# Patient Record
Sex: MALE | Race: OTHER | URBAN - NONMETROPOLITAN AREA
[De-identification: names, ages, dates, MRNs, and addresses within clinical notes are randomized per-mention and may not be internally consistent; named-entity substitution may affect disease eponyms.]

---

## 2020-01-03 ENCOUNTER — OFFICE VISIT (OUTPATIENT)
Dept: PRIMARY CARE CLINIC | Age: 50
End: 2020-01-03
Payer: COMMERCIAL

## 2020-01-03 VITALS
DIASTOLIC BLOOD PRESSURE: 78 MMHG | BODY MASS INDEX: 32.72 KG/M2 | SYSTOLIC BLOOD PRESSURE: 130 MMHG | WEIGHT: 203.6 LBS | OXYGEN SATURATION: 97 % | HEART RATE: 91 BPM | RESPIRATION RATE: 18 BRPM | TEMPERATURE: 99.3 F | HEIGHT: 66 IN

## 2020-01-03 LAB
INFLUENZA A ANTIBODY: NORMAL
INFLUENZA B ANTIBODY: NORMAL

## 2020-01-03 PROCEDURE — 99214 OFFICE O/P EST MOD 30 MIN: CPT | Performed by: FAMILY MEDICINE

## 2020-01-03 PROCEDURE — 87804 INFLUENZA ASSAY W/OPTIC: CPT | Performed by: FAMILY MEDICINE

## 2020-01-03 RX ORDER — AMOXICILLIN AND CLAVULANATE POTASSIUM 875; 125 MG/1; MG/1
1 TABLET, FILM COATED ORAL 2 TIMES DAILY
Qty: 20 TABLET | Refills: 0 | Status: SHIPPED | OUTPATIENT
Start: 2020-01-03 | End: 2020-01-13

## 2020-01-03 RX ORDER — ALBUTEROL SULFATE 90 UG/1
2 AEROSOL, METERED RESPIRATORY (INHALATION)
COMMUNITY
Start: 2016-05-18

## 2020-01-03 RX ORDER — DEXTROMETHORPHAN HYDROBROMIDE AND PROMETHAZINE HYDROCHLORIDE 15; 6.25 MG/5ML; MG/5ML
5 SYRUP ORAL NIGHTLY PRN
Qty: 75 ML | Refills: 0 | Status: SHIPPED | OUTPATIENT
Start: 2020-01-03

## 2020-01-03 RX ORDER — LORATADINE 10 MG/1
10 TABLET ORAL DAILY PRN
COMMUNITY

## 2020-01-03 SDOH — HEALTH STABILITY: MENTAL HEALTH: HOW OFTEN DO YOU HAVE A DRINK CONTAINING ALCOHOL?: 2-4 TIMES A MONTH

## 2020-01-03 SDOH — HEALTH STABILITY: MENTAL HEALTH: HOW MANY STANDARD DRINKS CONTAINING ALCOHOL DO YOU HAVE ON A TYPICAL DAY?: 3 OR 4

## 2020-01-03 ASSESSMENT — PATIENT HEALTH QUESTIONNAIRE - PHQ9
SUM OF ALL RESPONSES TO PHQ QUESTIONS 1-9: 0
2. FEELING DOWN, DEPRESSED OR HOPELESS: 0
1. LITTLE INTEREST OR PLEASURE IN DOING THINGS: 0
SUM OF ALL RESPONSES TO PHQ9 QUESTIONS 1 & 2: 0
SUM OF ALL RESPONSES TO PHQ QUESTIONS 1-9: 0

## 2020-01-03 ASSESSMENT — ENCOUNTER SYMPTOMS
COUGH: 1
SINUS PRESSURE: 1
WHEEZING: 1
NAUSEA: 0
DIARRHEA: 0
RHINORRHEA: 1
SWOLLEN GLANDS: 1
SORE THROAT: 1
VOMITING: 0

## 2020-01-04 NOTE — PROGRESS NOTES
[address]  [address], [address] Amber Arriaga  [phone]      Marylene Dawn. is a 47 y.o. male who is c/o of Cough (Cough (yellow, white), congestion, (intermittent) ST, gen. body aches, H/A, felt fevered/chills/sweats & SOB w/exertion x3 days.  )      HPI:     Pharyngitis   This is a new problem. The current episode started in the past 7 days (3 days ago). The problem has been gradually improving. Associated symptoms include chills, congestion, coughing (productive of yellow mucus), diaphoresis, headaches, myalgias, a sore throat and swollen glands. Pertinent negatives include no nausea or vomiting. Fever: unsure - did not check at home; 99.3 here now. Treatments tried: Nyquil/Dayquil, Claritin, warm salt water gargles. Subjective:      Past Medical History:   Diagnosis Date    Allergic rhinitis     Asthma     Diabetes mellitus (HCC)       Past Surgical History:   Procedure Laterality Date    CHOLECYSTECTOMY      HERNIA REPAIR         Social History     Tobacco Use    Smoking status: Former Smoker     Packs/day: 0.25     Years: 28.00     Pack years: 7.00     Start date:      Last attempt to quit: 2012     Years since quittin.0    Smokeless tobacco: Never Used   Substance Use Topics    Alcohol use: Yes     Frequency: 2-4 times a month     Drinks per session: 3 or 4      Current Outpatient Medications   Medication Sig Dispense Refill    albuterol sulfate  (90 Base) MCG/ACT inhaler Inhale 2 puffs into the lungs every 4-6 hours as needed      loratadine (CLARITIN) 10 MG tablet Take 10 mg by mouth daily as needed      metFORMIN (GLUCOPHAGE) 500 MG tablet Take 500 mg by mouth daily      amoxicillin-clavulanate (AUGMENTIN) 875-125 MG per tablet Take 1 tablet by mouth 2 times daily for 10 days Take with food.  20 tablet 0    promethazine-dextromethorphan (PROMETHAZINE-DM) 6.25-15 MG/5ML syrup Take 5 mLs by mouth nightly as needed

## 2022-12-07 ENCOUNTER — OFFICE VISIT (OUTPATIENT)
Dept: PRIMARY CARE CLINIC | Age: 52
End: 2022-12-07
Payer: COMMERCIAL

## 2022-12-07 VITALS
WEIGHT: 190 LBS | BODY MASS INDEX: 29.82 KG/M2 | HEIGHT: 67 IN | HEART RATE: 68 BPM | RESPIRATION RATE: 16 BRPM | DIASTOLIC BLOOD PRESSURE: 88 MMHG | TEMPERATURE: 98.2 F | SYSTOLIC BLOOD PRESSURE: 132 MMHG | OXYGEN SATURATION: 95 %

## 2022-12-07 DIAGNOSIS — U07.1 COVID-19: Primary | ICD-10-CM

## 2022-12-07 DIAGNOSIS — B34.9 VIRAL SYNDROME: ICD-10-CM

## 2022-12-07 LAB
INFLUENZA A ANTIGEN, POC: NEGATIVE
INFLUENZA B ANTIGEN, POC: NEGATIVE
LOT EXPIRE DATE: ABNORMAL
LOT KIT NUMBER: ABNORMAL
SARS-COV-2, POC: DETECTED
VALID INTERNAL CONTROL: YES
VENDOR AND KIT NAME POC: ABNORMAL

## 2022-12-07 PROCEDURE — 99213 OFFICE O/P EST LOW 20 MIN: CPT | Performed by: FAMILY MEDICINE

## 2022-12-07 PROCEDURE — 87428 SARSCOV & INF VIR A&B AG IA: CPT | Performed by: FAMILY MEDICINE

## 2022-12-07 RX ORDER — ATORVASTATIN CALCIUM 20 MG/1
20 TABLET, FILM COATED ORAL DAILY
COMMUNITY
Start: 2022-09-14

## 2022-12-07 ASSESSMENT — PATIENT HEALTH QUESTIONNAIRE - PHQ9
2. FEELING DOWN, DEPRESSED OR HOPELESS: 0
SUM OF ALL RESPONSES TO PHQ QUESTIONS 1-9: 0
1. LITTLE INTEREST OR PLEASURE IN DOING THINGS: 0
SUM OF ALL RESPONSES TO PHQ QUESTIONS 1-9: 0
SUM OF ALL RESPONSES TO PHQ9 QUESTIONS 1 & 2: 0

## 2022-12-07 ASSESSMENT — ENCOUNTER SYMPTOMS
VOMITING: 0
COUGH: 1
SORE THROAT: 1
NAUSEA: 1

## 2022-12-07 NOTE — LETTER
[facility] A department of [facility]  Phone: [phone]  Fax: [address], DO      December 7, 2022    Patient:   Jaun Maher. Date of Birth   1970  Date of visit   12/7/2022        To Whom it May Concern:      Dallin Fox was seen in my clinic on 12/7/2022. Please excuse from work 12/7/22-12/11/22 due to acute COVID infection. May return 12/12/22 with mask wearing for an additional 5 days. If you have any questions or concerns, please don't hesitate to call.       Sincerely,      Tucker Brasher, DO

## 2022-12-07 NOTE — PROGRESS NOTES
2022     Harish Swanson (:  1970) is a 47 y.o. male, here for evaluation of the following medical concerns:    Pharyngitis  This is a new problem. The current episode started in the past 7 days. The problem occurs constantly. The problem has been gradually worsening. Associated symptoms include congestion, coughing, a fever, myalgias, nausea and a sore throat. Pertinent negatives include no headaches or vomiting. He has tried nothing for the symptoms. Did review patient's med list, allergies, social history,pmhx and pshx today as noted in the record. Review of Systems   Constitutional:  Positive for fever. HENT:  Positive for congestion and sore throat. Respiratory:  Positive for cough. Gastrointestinal:  Positive for nausea. Negative for vomiting. Musculoskeletal:  Positive for myalgias. Neurological:  Negative for headaches. Prior to Visit Medications    Medication Sig Taking?  Authorizing Provider   atorvastatin (LIPITOR) 20 MG tablet Take 20 mg by mouth daily Yes Historical Provider, MD   albuterol sulfate  (90 Base) MCG/ACT inhaler Inhale 2 puffs into the lungs every 4-6 hours as needed Yes Historical Provider, MD   loratadine (CLARITIN) 10 MG tablet Take 10 mg by mouth daily as needed Yes Historical Provider, MD   metFORMIN (GLUCOPHAGE) 500 MG tablet Take 500 mg by mouth daily Yes Historical Provider, MD        Social History     Tobacco Use    Smoking status: Former     Packs/day: 0.25     Years: 28.00     Pack years: 7.00     Types: Cigarettes     Start date:      Quit date: 2012     Years since quitting: 10.9    Smokeless tobacco: Never   Substance Use Topics    Alcohol use: Yes        Vitals:    22 1650   BP: 132/88   Pulse: 68   Resp: 16   Temp: 98.2 °F (36.8 °C)   SpO2: 95%   Weight: 190 lb (86.2 kg)   Height: 5' 6.5\" (1.689 m)     Estimated body mass index is 30.21 kg/m² as calculated from the following:    Height as of this encounter: 5' 6.5\" (1.689 m). Weight as of this encounter: 190 lb (86.2 kg). Physical Exam  Vitals and nursing note reviewed. Constitutional:       General: He is not in acute distress. Appearance: Normal appearance. He is well-developed. He is not diaphoretic. HENT:      Head: Normocephalic and atraumatic. Right Ear: External ear normal.      Left Ear: External ear normal.      Ears:      Comments: TMs dull with fluid behind the TM     Nose: Congestion and rhinorrhea present. Mouth/Throat:      Pharynx: No posterior oropharyngeal erythema. Comments: Post nasal drainage noted  Eyes:      General: No scleral icterus. Right eye: No discharge. Left eye: No discharge. Conjunctiva/sclera: Conjunctivae normal.      Pupils: Pupils are equal, round, and reactive to light. Neck:      Thyroid: No thyromegaly. Cardiovascular:      Rate and Rhythm: Normal rate and regular rhythm. Heart sounds: Normal heart sounds. Pulmonary:      Effort: Pulmonary effort is normal. No respiratory distress. Breath sounds: Normal breath sounds. No wheezing. Musculoskeletal:      Cervical back: Normal range of motion and neck supple. Lymphadenopathy:      Cervical: Cervical adenopathy present. Skin:     General: Skin is warm and dry. Findings: No rash. Neurological:      Mental Status: He is alert and oriented to person, place, and time. Psychiatric:         Behavior: Behavior normal.         Thought Content: Thought content normal.         Judgment: Judgment normal.       ASSESSMENT/PLAN:  Encounter Diagnoses   Name Primary? COVID-19 Yes    Viral syndrome      Did discuss paxlovid with patient. Patient declined. Increase fluids and rest    Orders Placed This Encounter   Procedures    POCT COVID-19 & Influenza A/B     Order Specific Question:   Pregnant:      Answer:   No     Rapid covid and influenza testing today revealed positive covid test    Tylenol/Motrin prn    Should remain in quarantine for 5 days and if symptoms are improving and is fever free can then go out of quarantine and wear a mask for 5 days. Work note is provided. Return  if no improvement in symptoms or if any further symptoms arise. No follow-ups on file. An electronic signature was used to authenticate this note.     --Brandi Phelps, DO on 12/7/2022 at 5:00 PM

## 2023-10-20 ENCOUNTER — OFFICE VISIT (OUTPATIENT)
Dept: PRIMARY CARE CLINIC | Age: 53
End: 2023-10-20

## 2023-10-20 VITALS
SYSTOLIC BLOOD PRESSURE: 130 MMHG | OXYGEN SATURATION: 95 % | HEIGHT: 66 IN | DIASTOLIC BLOOD PRESSURE: 82 MMHG | TEMPERATURE: 98.2 F | BODY MASS INDEX: 31.85 KG/M2 | WEIGHT: 198.2 LBS | HEART RATE: 63 BPM

## 2023-10-20 DIAGNOSIS — J40 BRONCHITIS: ICD-10-CM

## 2023-10-20 DIAGNOSIS — R05.9 COUGH IN ADULT: Primary | ICD-10-CM

## 2023-10-20 PROBLEM — E66.9 OBESITY (BMI 30.0-34.9): Status: ACTIVE | Noted: 2023-10-20

## 2023-10-20 PROBLEM — Z87.891 HISTORY OF TOBACCO USE: Status: ACTIVE | Noted: 2023-10-20

## 2023-10-20 PROBLEM — E66.811 OBESITY (BMI 30.0-34.9): Status: ACTIVE | Noted: 2023-10-20

## 2023-10-20 PROBLEM — E11.9 TYPE 2 DIABETES MELLITUS WITHOUT COMPLICATION (HCC): Status: ACTIVE | Noted: 2018-06-21

## 2023-10-20 LAB
INFLUENZA A ANTIGEN, POC: NEGATIVE
INFLUENZA B ANTIGEN, POC: NEGATIVE
LOT EXPIRE DATE: NORMAL
LOT KIT NUMBER: NORMAL
SARS-COV-2, POC: NORMAL
VALID INTERNAL CONTROL: NORMAL
VENDOR AND KIT NAME POC: NORMAL

## 2023-10-20 PROCEDURE — 99213 OFFICE O/P EST LOW 20 MIN: CPT | Performed by: FAMILY MEDICINE

## 2023-10-20 PROCEDURE — 87428 SARSCOV & INF VIR A&B AG IA: CPT | Performed by: FAMILY MEDICINE

## 2023-10-20 PROCEDURE — PBSHW POCT COVID-19 & INFLUENZA A/B: Performed by: FAMILY MEDICINE

## 2023-10-20 RX ORDER — DOXYCYCLINE HYCLATE 100 MG
100 TABLET ORAL 2 TIMES DAILY
Qty: 20 TABLET | Refills: 0 | Status: SHIPPED | OUTPATIENT
Start: 2023-10-20 | End: 2023-10-30

## 2023-10-20 RX ORDER — ALBUTEROL SULFATE 90 UG/1
2 AEROSOL, METERED RESPIRATORY (INHALATION) EVERY 4 HOURS PRN
Qty: 18 G | Refills: 0 | Status: SHIPPED | OUTPATIENT
Start: 2023-10-20

## 2023-10-20 RX ORDER — PREDNISONE 20 MG/1
20 TABLET ORAL 2 TIMES DAILY
Qty: 6 TABLET | Refills: 0 | Status: SHIPPED | OUTPATIENT
Start: 2023-10-20 | End: 2023-10-23

## 2023-10-20 SDOH — ECONOMIC STABILITY: FOOD INSECURITY: WITHIN THE PAST 12 MONTHS, YOU WORRIED THAT YOUR FOOD WOULD RUN OUT BEFORE YOU GOT MONEY TO BUY MORE.: NEVER TRUE

## 2023-10-20 SDOH — ECONOMIC STABILITY: INCOME INSECURITY: HOW HARD IS IT FOR YOU TO PAY FOR THE VERY BASICS LIKE FOOD, HOUSING, MEDICAL CARE, AND HEATING?: NOT HARD AT ALL

## 2023-10-20 SDOH — ECONOMIC STABILITY: HOUSING INSECURITY
IN THE LAST 12 MONTHS, WAS THERE A TIME WHEN YOU DID NOT HAVE A STEADY PLACE TO SLEEP OR SLEPT IN A SHELTER (INCLUDING NOW)?: NO

## 2023-10-20 SDOH — ECONOMIC STABILITY: FOOD INSECURITY: WITHIN THE PAST 12 MONTHS, THE FOOD YOU BOUGHT JUST DIDN'T LAST AND YOU DIDN'T HAVE MONEY TO GET MORE.: NEVER TRUE

## 2023-10-20 ASSESSMENT — PATIENT HEALTH QUESTIONNAIRE - PHQ9
SUM OF ALL RESPONSES TO PHQ QUESTIONS 1-9: 0
SUM OF ALL RESPONSES TO PHQ QUESTIONS 1-9: 0
2. FEELING DOWN, DEPRESSED OR HOPELESS: 0
SUM OF ALL RESPONSES TO PHQ QUESTIONS 1-9: 0
1. LITTLE INTEREST OR PLEASURE IN DOING THINGS: 0
SUM OF ALL RESPONSES TO PHQ9 QUESTIONS 1 & 2: 0
SUM OF ALL RESPONSES TO PHQ QUESTIONS 1-9: 0

## 2023-10-20 NOTE — PROGRESS NOTES
[id number] [facility] [employer]             [address], [address] [place] [address]                        Telephone [phone]             Fax [phone]       Liana Craig. :  1970  Age:  46 y.o. MRN:  [id number]  Date of visit:  10/20/2023       Assessment & Plan:    1. Cough in adult  2. Bronchitis  I reviewed the results of testing done today with the patient. Doxycycline and Prednisone were refilled:  - doxycycline hyclate (VIBRA-TABS) 100 MG tablet; Take 1 tablet by mouth 2 times daily for 10 days  Dispense: 20 tablet; Refill: 0  - predniSONE (DELTASONE) 20 MG tablet; Take 1 tablet by mouth 2 times daily for 3 days Take with food. Dispense: 6 tablet; Refill: 0    Albuterol was also refilled:  - albuterol sulfate HFA (PROVENTIL;VENTOLIN;PROAIR) 108 (90 Base) MCG/ACT inhaler; Inhale 2 puffs into the lungs every 4 hours as needed for Wheezing or Shortness of Breath  Dispense: 18 g; Refill: 0    He was advised to follow up if symptoms worsen or do not resolve. Subjective:    Liana Saba is a 46 y.o. male who presents to [id number] [facility] today (10/20/2023) for evaluation of:  Cough (Runny nose, congestion in chest, itchy everywhere, dry cough started last night, has an inhaler, hasn't had to use it much the last couple years, but the last few days he has been using it a lot, hot flashes/Looked like a bug bite Tuesday, entire body feels itchy)      He states that he has had cough and congestion for approximately 2 weeks. He has a runny nose and a non-productive cough. He denies fever. He also reports \"itching everywhere\" since 10/17/2023. He questions if he was bitten by an insect. He has an inhaler, and he has had to use that more recently.          Current medications are:  Current Outpatient Medications   Medication Sig Dispense Refill    atorvastatin (LIPITOR) 20 MG tablet Take 1 tablet by mouth daily

## 2023-11-10 DIAGNOSIS — J40 BRONCHITIS: ICD-10-CM

## 2023-11-10 RX ORDER — ALBUTEROL SULFATE 90 UG/1
AEROSOL, METERED RESPIRATORY (INHALATION)
Qty: 18 G | Refills: 0 | OUTPATIENT
Start: 2023-11-10

## 2024-09-27 ENCOUNTER — HOSPITAL ENCOUNTER (EMERGENCY)
Age: 54
Discharge: HOME | End: 2024-09-27
Attending: EMERGENCY MEDICINE
Payer: COMMERCIAL

## 2024-09-27 VITALS
TEMPERATURE: 98.7 F | BODY MASS INDEX: 33.75 KG/M2 | RESPIRATION RATE: 18 BRPM | WEIGHT: 210 LBS | OXYGEN SATURATION: 97 % | SYSTOLIC BLOOD PRESSURE: 127 MMHG | DIASTOLIC BLOOD PRESSURE: 78 MMHG | HEIGHT: 66 IN | HEART RATE: 67 BPM

## 2024-09-27 DIAGNOSIS — J98.01 BRONCHOSPASM: ICD-10-CM

## 2024-09-27 DIAGNOSIS — J06.9 VIRAL URI WITH COUGH: Primary | ICD-10-CM

## 2024-09-27 PROCEDURE — 99282 EMERGENCY DEPT VISIT SF MDM: CPT

## 2024-09-27 ASSESSMENT — PAIN - FUNCTIONAL ASSESSMENT
PAIN_FUNCTIONAL_ASSESSMENT: 0-10
PAIN_FUNCTIONAL_ASSESSMENT: NONE - DENIES PAIN

## 2024-09-27 ASSESSMENT — PAIN SCALES - GENERAL: PAINLEVEL_OUTOF10: 3

## 2024-09-27 ASSESSMENT — PAIN DESCRIPTION - LOCATION: LOCATION: HEAD

## 2024-09-28 ASSESSMENT — ENCOUNTER SYMPTOMS
WHEEZING: 1
SHORTNESS OF BREATH: 0
NAUSEA: 0
VOMITING: 0

## 2024-09-28 NOTE — ED PROVIDER NOTES
[facility]  [address] [address]  Phone: [phone]  eMERGENCY dEPARTMENT eNCOUnter      Pt Name: Brandon Suarez Jr.  MRN: [id number]  Birthdate 1970  Date of evaluation: 9/28/24      CHIEF COMPLAINT     Chief Complaint   Patient presents with    Nasal Congestion     Pt c/o head congestion that started yesterday, took day off work thought rest would help, took a covid test yesterday and today, both negative, NAD noted, \"Can I get the work note for yesterday\" informed unable to back date       HISTORY OF PRESENT ILLNESS    Brandon Suarez Jr. is a 54 y.o. male who presents today for evaluation of 2 days of mild diffuse headache, nasal congestion and sore throat.  Patient does have a history of asthma and he required his albuterol rescue inhaler earlier but since feels improved after taking that.  No associated chest pain.  Patient denies recalling requiring steroids or hospitalization for previous asthma.  He states that he needed to stay home from work due to his illness.    REVIEW OF SYSTEMS       Review of Systems   Constitutional:  Negative for fever.   Eyes:  Negative for visual disturbance.   Respiratory:  Positive for wheezing. Negative for shortness of breath.    Gastrointestinal:  Negative for nausea and vomiting.   Skin:  Negative for rash.   Neurological:  Positive for headaches. Negative for syncope.        PAST MEDICAL HISTORY    has a past medical history of Allergic rhinitis, Asthma, and Diabetes mellitus (HCC).    SURGICAL HISTORY      has a past surgical history that includes Cholecystectomy and hernia repair.    CURRENT MEDICATIONS       Discharge Medication List as of 9/27/2024 10:29 PM        CONTINUE these medications which have NOT CHANGED    Details   !! albuterol sulfate HFA (PROVENTIL;VENTOLIN;PROAIR) 108 (90 Base) MCG/ACT inhaler Inhale 2 puffs into the lungs every 4 hours as needed for Wheezing or Shortness of Breath, Disp-18 g, R-0Normal

## 2024-09-28 NOTE — DISCHARGE INSTRUCTIONS
Please follow-up with your PCP within 2 days.  Use your albuterol inhaler every 4-6 hours as needed for wheezing.  Return to the emergency department for fevers that are persistent, nausea vomiting, chest pain or difficulty breathing, passing out or near passing out episodes or any other acute concerns.  You may use over-the-counter acetaminophen and ibuprofen unless otherwise contraindicated.  Drink plenty of fluids.